# Patient Record
Sex: MALE | Race: WHITE
[De-identification: names, ages, dates, MRNs, and addresses within clinical notes are randomized per-mention and may not be internally consistent; named-entity substitution may affect disease eponyms.]

---

## 2018-12-13 ENCOUNTER — HOSPITAL ENCOUNTER (OUTPATIENT)
Dept: HOSPITAL 8 - RAD | Age: 58
Discharge: HOME | End: 2018-12-13
Attending: NURSE PRACTITIONER
Payer: COMMERCIAL

## 2018-12-13 DIAGNOSIS — E04.1: Primary | ICD-10-CM

## 2018-12-13 PROCEDURE — 10022: CPT

## 2018-12-13 PROCEDURE — 76942 ECHO GUIDE FOR BIOPSY: CPT

## 2018-12-13 PROCEDURE — 88173 CYTOPATH EVAL FNA REPORT: CPT

## 2018-12-13 PROCEDURE — 88172 CYTP DX EVAL FNA 1ST EA SITE: CPT

## 2020-08-27 ENCOUNTER — HOSPITAL ENCOUNTER (EMERGENCY)
Dept: HOSPITAL 8 - ED | Age: 60
Discharge: HOME | End: 2020-08-27
Payer: COMMERCIAL

## 2020-08-27 VITALS — DIASTOLIC BLOOD PRESSURE: 97 MMHG | SYSTOLIC BLOOD PRESSURE: 162 MMHG

## 2020-08-27 VITALS — BODY MASS INDEX: 25.14 KG/M2 | WEIGHT: 185.63 LBS | HEIGHT: 72 IN
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
## 2020-08-27 DIAGNOSIS — R94.31: ICD-10-CM

## 2020-08-27 DIAGNOSIS — S82.841A: ICD-10-CM

## 2020-08-27 DIAGNOSIS — Z20.828: ICD-10-CM
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
## 2020-08-27 DIAGNOSIS — E11.9: ICD-10-CM

## 2020-08-27 DIAGNOSIS — E03.9: ICD-10-CM

## 2020-08-27 DIAGNOSIS — Z03.818: Primary | ICD-10-CM
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
## 2020-08-27 DIAGNOSIS — E78.00: ICD-10-CM

## 2020-08-27 LAB
ALBUMIN SERPL-MCNC: 4.1 G/DL (ref 3.4–5)
ALP SERPL-CCNC: 67 U/L (ref 45–117)
ALT SERPL-CCNC: 37 U/L (ref 12–78)
ANION GAP SERPL CALC-SCNC: 8 MMOL/L (ref 5–15)
BASOPHILS # BLD AUTO: 0.02 X10^3/UL (ref 0–0.1)
BASOPHILS NFR BLD AUTO: 0 % (ref 0–1)
BILIRUB SERPL-MCNC: 1.5 MG/DL (ref 0.2–1)
CALCIUM SERPL-MCNC: 9 MG/DL (ref 8.5–10.1)
CHLORIDE SERPL-SCNC: 106 MMOL/L (ref 98–107)
CREAT SERPL-MCNC: 0.99 MG/DL (ref 0.7–1.3)
EOSINOPHIL # BLD AUTO: 0.13 X10^3/UL (ref 0–0.4)
EOSINOPHIL NFR BLD AUTO: 2 % (ref 1–7)
ERYTHROCYTE [DISTWIDTH] IN BLOOD BY AUTOMATED COUNT: 13 % (ref 9.4–14.8)
LYMPHOCYTES # BLD AUTO: 0.67 X10^3/UL (ref 1–3.4)
LYMPHOCYTES NFR BLD AUTO: 9 % (ref 22–44)
MCH RBC QN AUTO: 29.8 PG (ref 27.5–34.5)
MCHC RBC AUTO-ENTMCNC: 32.4 G/DL (ref 33.2–36.2)
MCV RBC AUTO: 92 FL (ref 81–97)
MD: NO
MONOCYTES # BLD AUTO: 0.5 X10^3/UL (ref 0.2–0.8)
MONOCYTES NFR BLD AUTO: 7 % (ref 2–9)
NEUTROPHILS # BLD AUTO: 6.28 X10^3/UL (ref 1.8–6.8)
NEUTROPHILS NFR BLD AUTO: 83 % (ref 42–75)
PLATELET # BLD AUTO: 162 X10^3/UL (ref 130–400)
PMV BLD AUTO: 7.9 FL (ref 7.4–10.4)
PROT SERPL-MCNC: 7.3 G/DL (ref 6.4–8.2)
RBC # BLD AUTO: 4.82 X10^6/UL (ref 4.38–5.82)

## 2020-08-27 PROCEDURE — 27818 TREATMENT OF ANKLE FRACTURE: CPT

## 2020-08-27 PROCEDURE — 96372 THER/PROPH/DIAG INJ SC/IM: CPT

## 2020-08-27 PROCEDURE — 99152 MOD SED SAME PHYS/QHP 5/>YRS: CPT

## 2020-08-27 PROCEDURE — 99285 EMERGENCY DEPT VISIT HI MDM: CPT

## 2020-08-27 PROCEDURE — 73590 X-RAY EXAM OF LOWER LEG: CPT

## 2020-08-27 PROCEDURE — 73600 X-RAY EXAM OF ANKLE: CPT

## 2020-08-27 PROCEDURE — 80053 COMPREHEN METABOLIC PANEL: CPT

## 2020-08-27 PROCEDURE — 36415 COLL VENOUS BLD VENIPUNCTURE: CPT

## 2020-08-27 PROCEDURE — 93005 ELECTROCARDIOGRAM TRACING: CPT

## 2020-08-27 PROCEDURE — 87635 SARS-COV-2 COVID-19 AMP PRB: CPT

## 2020-08-27 PROCEDURE — 71045 X-RAY EXAM CHEST 1 VIEW: CPT

## 2020-08-27 PROCEDURE — 85025 COMPLETE CBC W/AUTO DIFF WBC: CPT

## 2020-08-27 NOTE — NUR
PT TOLERATED PROCEDURAL SEDATION. 10 MG ETOMIDATE ADMIN BY MD. PT DID HAVE 1 
EPISODE OF EMESIS DURING SEDATION. SUCTIONED AS NEEDED. ANKLE REDUCED AND 
WRAPPED. WILL CONTINUE TO MONITOR PT .

## 2020-08-27 NOTE — NUR
PT BIB EMS, PT HAD FALL AT HOME BECAUSE HE GOT TANGLED UP WITH HIS CAT. 
POTENTIAL LOWER EXTREMITY FX. EMS SPLINTED WOUND WITH SOFT PILLOW. PATIENT 
GIVEN 27 MG KETAMINE, 2 MG VERSED, 8 MG ZOFRAN PER EMS. PATIENT HAVING 7/10 
PAIN, REGULAR BREATHING, AND IS DROWSY. PATIENT PLACED ON MONITORS, SAFETY 
PRECAUTIONS IN PLACE

## 2020-09-01 ENCOUNTER — HOSPITAL ENCOUNTER (OUTPATIENT)
Dept: HOSPITAL 8 - OUT | Age: 60
Discharge: HOME | End: 2020-09-01
Attending: ORTHOPAEDIC SURGERY
Payer: COMMERCIAL

## 2020-09-01 VITALS — BODY MASS INDEX: 26.87 KG/M2 | HEIGHT: 72 IN | WEIGHT: 198.42 LBS

## 2020-09-01 VITALS — SYSTOLIC BLOOD PRESSURE: 121 MMHG | DIASTOLIC BLOOD PRESSURE: 80 MMHG

## 2020-09-01 DIAGNOSIS — G89.18: ICD-10-CM
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
## 2020-09-01 DIAGNOSIS — E78.5: ICD-10-CM

## 2020-09-01 DIAGNOSIS — E11.9: ICD-10-CM
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
## 2020-09-01 DIAGNOSIS — S82.851A: Primary | ICD-10-CM

## 2020-09-01 DIAGNOSIS — K21.9: ICD-10-CM

## 2020-09-01 DIAGNOSIS — Z79.899: ICD-10-CM
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
## 2020-09-01 DIAGNOSIS — I10: ICD-10-CM

## 2020-09-01 PROCEDURE — 27822 TREATMENT OF ANKLE FRACTURE: CPT

## 2020-09-01 PROCEDURE — 73600 X-RAY EXAM OF ANKLE: CPT

## 2020-09-01 PROCEDURE — 76000 FLUOROSCOPY <1 HR PHYS/QHP: CPT

## 2020-09-01 PROCEDURE — 64445 NJX AA&/STRD SCIATIC NRV IMG: CPT

## 2020-09-01 PROCEDURE — 64447 NJX AA&/STRD FEMORAL NRV IMG: CPT

## 2020-09-01 PROCEDURE — C1713 ANCHOR/SCREW BN/BN,TIS/BN: HCPCS

## 2020-09-01 PROCEDURE — 82962 GLUCOSE BLOOD TEST: CPT

## 2020-09-01 RX ADMIN — FENTANYL CITRATE PRN MCG: 50 INJECTION INTRAMUSCULAR; INTRAVENOUS at 10:31

## 2020-09-01 RX ADMIN — FENTANYL CITRATE PRN MCG: 50 INJECTION INTRAMUSCULAR; INTRAVENOUS at 10:26
